# Patient Record
Sex: MALE | Race: WHITE | NOT HISPANIC OR LATINO | URBAN - NONMETROPOLITAN AREA
[De-identification: names, ages, dates, MRNs, and addresses within clinical notes are randomized per-mention and may not be internally consistent; named-entity substitution may affect disease eponyms.]

---

## 2017-08-16 ENCOUNTER — NEW SKIN PROBLEM (OUTPATIENT)
Dept: URBAN - NONMETROPOLITAN AREA CLINIC 3 | Facility: CLINIC | Age: 62
Setting detail: DERMATOLOGY
End: 2017-08-16

## 2017-08-16 DIAGNOSIS — B07.8 OTHER VIRAL WARTS: ICD-10-CM

## 2017-08-16 PROCEDURE — 99202 OFFICE O/P NEW SF 15 MIN: CPT

## 2017-08-16 PROCEDURE — 11100 BX SKIN SUBCUTANEOUS&/MUCOUS MEMBRANE 1 LESION: CPT

## 2018-02-02 ENCOUNTER — OTHER- (OUTPATIENT)
Dept: URBAN - NONMETROPOLITAN AREA CLINIC 3 | Facility: CLINIC | Age: 63
Setting detail: DERMATOLOGY
End: 2018-02-02

## 2018-02-02 DIAGNOSIS — L57.0 ACTINIC KERATOSIS: ICD-10-CM

## 2018-02-02 PROCEDURE — 99212 OFFICE O/P EST SF 10 MIN: CPT

## 2018-02-02 PROCEDURE — 11900 INJECT SKIN LESIONS </W 7: CPT

## 2018-10-18 ENCOUNTER — HOSPITAL ENCOUNTER (EMERGENCY)
Dept: HOSPITAL 62 - ER | Age: 63
Discharge: HOME | End: 2018-10-18
Payer: MEDICARE

## 2018-10-18 VITALS — SYSTOLIC BLOOD PRESSURE: 132 MMHG | DIASTOLIC BLOOD PRESSURE: 72 MMHG

## 2018-10-18 DIAGNOSIS — D72.828: ICD-10-CM

## 2018-10-18 DIAGNOSIS — R42: ICD-10-CM

## 2018-10-18 DIAGNOSIS — A09: Primary | ICD-10-CM

## 2018-10-18 DIAGNOSIS — I10: ICD-10-CM

## 2018-10-18 DIAGNOSIS — R10.9: ICD-10-CM

## 2018-10-18 DIAGNOSIS — F17.200: ICD-10-CM

## 2018-10-18 DIAGNOSIS — R11.2: ICD-10-CM

## 2018-10-18 LAB
ABSOLUTE LYMPHOCYTES# (MANUAL): 0.3 10^3/UL (ref 0.5–4.7)
ABSOLUTE LYMPHOCYTES# (MANUAL): 1.3 10^3/UL (ref 0.5–4.7)
ABSOLUTE MONOCYTES # (MANUAL): 0.7 10^3/UL (ref 0.1–1.4)
ABSOLUTE MONOCYTES # (MANUAL): 1 10^3/UL (ref 0.1–1.4)
ABSOLUTE NEUTROPHILS# (MANUAL): 13.8 10^3/UL (ref 1.7–8.2)
ABSOLUTE NEUTROPHILS# (MANUAL): 9.4 10^3/UL (ref 1.7–8.2)
ADD MANUAL DIFF: YES
ADD MANUAL DIFF: YES
ALBUMIN SERPL-MCNC: 4.7 G/DL (ref 3.5–5)
ALP SERPL-CCNC: 103 U/L (ref 38–126)
ALT SERPL-CCNC: 42 U/L (ref 21–72)
ANION GAP SERPL CALC-SCNC: 16 MMOL/L (ref 5–19)
APPEARANCE UR: (no result)
APTT PPP: YELLOW S
AST SERPL-CCNC: 32 U/L (ref 17–59)
BASE EXCESS BLDV CALC-SCNC: -0.9 MMOL/L
BASOPHILS NFR BLD MANUAL: 0 % (ref 0–2)
BASOPHILS NFR BLD MANUAL: 0 % (ref 0–2)
BILIRUB DIRECT SERPL-MCNC: 0.5 MG/DL (ref 0–0.4)
BILIRUB SERPL-MCNC: 2 MG/DL (ref 0.2–1.3)
BILIRUB UR QL STRIP: NEGATIVE
BUN SERPL-MCNC: 20 MG/DL (ref 7–20)
CALCIUM: 10.1 MG/DL (ref 8.4–10.2)
CHLORIDE SERPL-SCNC: 102 MMOL/L (ref 98–107)
CO2 SERPL-SCNC: 21 MMOL/L (ref 22–30)
EOSINOPHIL NFR BLD MANUAL: 0 % (ref 0–6)
EOSINOPHIL NFR BLD MANUAL: 0 % (ref 0–6)
ERYTHROCYTE [DISTWIDTH] IN BLOOD BY AUTOMATED COUNT: 13.6 % (ref 11.5–14)
ERYTHROCYTE [DISTWIDTH] IN BLOOD BY AUTOMATED COUNT: 13.7 % (ref 11.5–14)
GLUCOSE SERPL-MCNC: 200 MG/DL (ref 75–110)
GLUCOSE UR STRIP-MCNC: NEGATIVE MG/DL
HCO3 BLDV-SCNC: 22.2 MMOL/L (ref 20–32)
HCT VFR BLD CALC: 40.9 % (ref 37.9–51)
HCT VFR BLD CALC: 47.5 % (ref 37.9–51)
HGB BLD-MCNC: 14.1 G/DL (ref 13.5–17)
HGB BLD-MCNC: 16.5 G/DL (ref 13.5–17)
KETONES UR STRIP-MCNC: 80 MG/DL
LIPASE SERPL-CCNC: 218.6 U/L (ref 23–300)
MCH RBC QN AUTO: 29.8 PG (ref 27–33.4)
MCH RBC QN AUTO: 30.4 PG (ref 27–33.4)
MCHC RBC AUTO-ENTMCNC: 34.4 G/DL (ref 32–36)
MCHC RBC AUTO-ENTMCNC: 34.8 G/DL (ref 32–36)
MCV RBC AUTO: 87 FL (ref 80–97)
MCV RBC AUTO: 88 FL (ref 80–97)
MONOCYTES % (MANUAL): 6 % (ref 3–13)
MONOCYTES % (MANUAL): 7 % (ref 3–13)
NEUTS BAND NFR BLD MANUAL: 14 % (ref 3–5)
NITRITE UR QL STRIP: NEGATIVE
PCO2 BLDV: 33 MMHG (ref 35–63)
PH BLDV: 7.45 [PH] (ref 7.3–7.42)
PH UR STRIP: 5 [PH] (ref 5–9)
PLATELET # BLD: 207 10^3/UL (ref 150–450)
PLATELET # BLD: 268 10^3/UL (ref 150–450)
PLATELET COMMENT: ADEQUATE
PLATELET COMMENT: ADEQUATE
POTASSIUM SERPL-SCNC: 3.9 MMOL/L (ref 3.6–5)
PROT SERPL-MCNC: 7.2 G/DL (ref 6.3–8.2)
PROT UR STRIP-MCNC: 30 MG/DL
RBC # BLD AUTO: 4.72 10^6/UL (ref 4.35–5.55)
RBC # BLD AUTO: 5.43 10^6/UL (ref 4.35–5.55)
RBC MORPH BLD: (no result)
SEGMENTED NEUTROPHILS % (MAN): 72 % (ref 42–78)
SEGMENTED NEUTROPHILS % (MAN): 90 % (ref 42–78)
SODIUM SERPL-SCNC: 138.6 MMOL/L (ref 137–145)
SP GR UR STRIP: 1.02
TOTAL CELLS COUNTED BLD: 100
TOTAL CELLS COUNTED BLD: 100
TOXIC GRANULES BLD QL SMEAR: SLIGHT
UROBILINOGEN UR-MCNC: 2 MG/DL (ref ?–2)
VARIANT LYMPHS NFR BLD MANUAL: 3 % (ref 13–45)
VARIANT LYMPHS NFR BLD MANUAL: 7 % (ref 13–45)
WBC # BLD AUTO: 10.4 10^3/UL (ref 4–10.5)
WBC # BLD AUTO: 16.1 10^3/UL (ref 4–10.5)

## 2018-10-18 PROCEDURE — 74177 CT ABD & PELVIS W/CONTRAST: CPT

## 2018-10-18 PROCEDURE — 93005 ELECTROCARDIOGRAM TRACING: CPT

## 2018-10-18 PROCEDURE — 83690 ASSAY OF LIPASE: CPT

## 2018-10-18 PROCEDURE — 80053 COMPREHEN METABOLIC PANEL: CPT

## 2018-10-18 PROCEDURE — 82803 BLOOD GASES ANY COMBINATION: CPT

## 2018-10-18 PROCEDURE — 99285 EMERGENCY DEPT VISIT HI MDM: CPT

## 2018-10-18 PROCEDURE — 85025 COMPLETE CBC W/AUTO DIFF WBC: CPT

## 2018-10-18 PROCEDURE — 96361 HYDRATE IV INFUSION ADD-ON: CPT

## 2018-10-18 PROCEDURE — 93010 ELECTROCARDIOGRAM REPORT: CPT

## 2018-10-18 PROCEDURE — 36415 COLL VENOUS BLD VENIPUNCTURE: CPT

## 2018-10-18 PROCEDURE — 81001 URINALYSIS AUTO W/SCOPE: CPT

## 2018-10-18 PROCEDURE — 83605 ASSAY OF LACTIC ACID: CPT

## 2018-10-18 PROCEDURE — 87040 BLOOD CULTURE FOR BACTERIA: CPT

## 2018-10-18 PROCEDURE — 96374 THER/PROPH/DIAG INJ IV PUSH: CPT

## 2018-10-18 PROCEDURE — 87493 C DIFF AMPLIFIED PROBE: CPT

## 2018-10-18 PROCEDURE — 84484 ASSAY OF TROPONIN QUANT: CPT

## 2018-10-18 RX ADMIN — SODIUM CHLORIDE, SODIUM LACTATE, POTASSIUM CHLORIDE, AND CALCIUM CHLORIDE PRN MLS/HR: .6; .31; .03; .02 INJECTION, SOLUTION INTRAVENOUS at 11:04

## 2018-10-18 RX ADMIN — SODIUM CHLORIDE, SODIUM LACTATE, POTASSIUM CHLORIDE, AND CALCIUM CHLORIDE PRN MLS/HR: .6; .31; .03; .02 INJECTION, SOLUTION INTRAVENOUS at 12:05

## 2018-10-18 NOTE — EKG REPORT
SEVERITY:- ABNORMAL ECG -

SINUS RHYTHM

LEFT ANTERIOR FASCICULAR BLOCK

PROBABLE LVH WITH SECONDARY REPOL ABNRM

QTC PROLONGATION

:

Confirmed by: Khalif Mirza MD 18-Oct-2018 12:38:18

## 2018-10-18 NOTE — EKG REPORT
SEVERITY:- BORDERLINE ECG -

SINUS RHYTHM

NONSPECIFIC ST-T CHANGES- LATERAL LEADS

:

Confirmed by: Khalif Mirza MD 18-Oct-2018 18:23:54

## 2018-10-18 NOTE — ER DOCUMENT REPORT
ED General





- General


Stated Complaint: DIZZINESS


Time Seen by Provider: 10/18/18 10:35


Information source: Patient





- HPI


Patient complains to provider of: lightheadedness


Onset: Other - This 63-year-old man with a history of hypertension and 

hyperlipidemia presents for evaluation of lightheadedness as well as abdominal 

cramping nausea vomiting and diarrhea for the last 3 days.  He notes that he 

has vomited approximately 12 times.  He now is progressed to having diarrhea 

intermittent cramping abdominal pain and what are subjective fevers.





- Related Data


Allergies/Adverse Reactions: 


 





No Known Allergies Allergy (Unverified 10/18/18 13:12)


 











Past Medical History





- General


Information source: Patient





- Social History


Smoking Status: Current Every Day Smoker


Family History: None





Review of Systems





- Review of Systems


-: Yes All other systems reviewed and negative





Physical Exam





- Vital signs


Vitals: 


 











Resp Pulse Ox


 


 25 H  98 


 


 10/18/18 10:48  10/18/18 10:48














- General


General appearance: Anxious


In distress: Mild





- HEENT


Head: Normocephalic


Eyes: Normal


Conjunctiva: Normal


Cornea: Normal


Extraocular movements intact: Yes


Eyelashes: Normal


Pupils: PERRL





- Respiratory


Respiratory status: No respiratory distress


Chest status: Nontender


Breath sounds: Normal


Chest palpation: Normal





- Cardiovascular


Rhythm: Tachycardia


Heart sounds: Normal auscultation


Murmur: No





- Abdominal


Inspection: Normal


Distension: Distended


Tenderness: Tender





- Back


Back: Normal





- Extremities


General upper extremity: Normal inspection, Nontender, Normal ROM, Normal 

strength


General lower extremity: Normal inspection, Nontender, Normal ROM, Normal 

strength





- Neurological


Neuro grossly intact: Yes


Cognition: Normal


Orientation: AAOx4


[place] Coma Scale Eye Opening: Spontaneous


[place] Coma Scale Verbal: Oriented


[place] Coma Scale Motor: Obeys Commands


[place] Coma Scale Total: 15


Speech: Normal


Cranial nerves: Normal


Motor strength normal: LUE, RUE, LLE, RLE





- Psychological


Associated symptoms: Normal affect





Course





- Re-evaluation


Re-evalutation: 





10/18/18 11:50


63-year-old man presents for evaluation of intermittent abdominal pain 

associated with vomiting and diarrhea for the last 3 days.


Examination he is in moderate distress, did defecate upon himself which is 

markedly abnormal from his baseline level of functioning.


Currently he denies any pain nausea or other symptoms disease he feels just 

generally weak.


Will initiate broad workup for his fevers as well as abdominal pain.


Will initiate IV fluid resuscitation.


Will plan for CBC CMP lactate blood cultures CT of the abdomen and pelvis.





10/18/18 15:30


Patient's lactic acid improved after administration of fluids, his CBC did 

demonstrate a leukocytosis however he was repeated and did show improvement.


His abdominal examination remained benign throughout his entire time in the 

emergency department.


His C. difficile study was negative.


He is able to tolerate p.o. in the emergency department including his oral 

antibiotics metronidazole as well as ciprofloxacin.


He stated he was comfortable with current plan to go home.


Did give patient prescriptions, called his prescriptions and to re-aloe as well 

at [phone].


At the time of discharge she remained well-appearing in the care of his wife.





- Vital Signs


Vital signs: 


 











Temp Pulse Resp BP Pulse Ox


 


 98.3 F      17   131/64 H  100 


 


 10/18/18 10:56     10/18/18 13:07  10/18/18 13:07  10/18/18 11:00














- Laboratory


Result Diagrams: 


 10/18/18 10:50





 10/18/18 10:50


Laboratory results interpreted by me: 


 











  10/18/18 10/18/18 10/18/18





  10:50 10:50 10:50


 


WBC  16.1 H  


 


Band Neutrophils %  14 H  


 


Lymphocytes % (Manual)  7 L  


 


Abs Neuts (Manual)  13.8 H  


 


VBG pH    7.45 H


 


VBG pCO2    33.0 L


 


Carbon Dioxide   21 L 


 


Glucose   200 H 


 


Lactic Acid   


 


Total Bilirubin   2.0 H 


 


Direct Bilirubin   0.5 H 


 


Urine Protein   


 


Urine Ketones   


 


Urine Urobilinogen   














  10/18/18 10/18/18





  10:50 12:30


 


WBC  


 


Band Neutrophils %  


 


Lymphocytes % (Manual)  


 


Abs Neuts (Manual)  


 


VBG pH  


 


VBG pCO2  


 


Carbon Dioxide  


 


Glucose  


 


Lactic Acid  3.0 H 


 


Total Bilirubin  


 


Direct Bilirubin  


 


Urine Protein   30 H


 


Urine Ketones   80 H


 


Urine Urobilinogen   2.0 H














Discharge





- Discharge


Clinical Impression: 


Diarrhea


Qualifiers:


 Diarrhea type: infectious Qualified Code(s): A09 - Infectious gastroenteritis 

and colitis, unspecified





Abdominal pain


Qualifiers:


 Abdominal location: unspecified location Qualified Code(s): R10.9 - 

Unspecified abdominal pain





Leukocytosis


Qualifiers:


 Leukocytosis type: other Qualified Code(s): D72.828 - Other elevated white 

blood cell count





Disposition: HOME, SELF-CARE


Instructions:  Diarrhea, Nonspecific ([facility]), Reglan ([facility]), Abdominal Pain ([facility])


Additional Instructions: 


Your seen today in the emergency department for your abdominal pain, vomiting 

and diarrhea.  He had evaluation including a CTA of your abdomen and pelvis as 

well as blood work.


You likely have an infectious diarrhea.


Use the antibiotics prescribed to you as directed.


Follow-up with your doctor this week to ensure that you are doing better.


Return in case you have worsening fever, chills, inability to eat or drink, 

pain which worsens.





Prescriptions: 


Ciprofloxacin HCl [Cipro 500 mg Tablet] 500 mg PO BID #20 tablet


Metoclopramide HCl [Reglan 10 mg Tablet] 10 mg PO TID PRN #15 tablet


 PRN Reason: For Nausea/Vomiting


Metronidazole [Flagyl 500 mg Tablet] 500 mg PO Q6H #28 tablet


Forms:  Elevated Blood Pressure
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
## 2018-10-19 LAB — PATH REV BLD -IMP: (no result)

## 2020-07-09 RX ORDER — TRETINOIN 0.5 MG/G
1 APPLICATION CREAM TOPICAL QHS
Qty: 45 | Refills: 3
Start: 2020-07-09

## 2024-07-22 ENCOUNTER — APPOINTMENT (OUTPATIENT)
Dept: URBAN - NONMETROPOLITAN AREA CLINIC 49 | Age: 69
Setting detail: DERMATOLOGY
End: 2024-07-28

## 2024-07-22 VITALS — DIASTOLIC BLOOD PRESSURE: 79 MMHG | SYSTOLIC BLOOD PRESSURE: 121 MMHG | HEART RATE: 77 BPM

## 2024-07-22 VITALS — HEART RATE: 74 BPM | SYSTOLIC BLOOD PRESSURE: 146 MMHG | DIASTOLIC BLOOD PRESSURE: 81 MMHG

## 2024-07-22 DIAGNOSIS — L57.0 ACTINIC KERATOSIS: ICD-10-CM

## 2024-07-22 PROBLEM — D48.5 NEOPLASM OF UNCERTAIN BEHAVIOR OF SKIN: Status: ACTIVE | Noted: 2024-07-22

## 2024-07-22 PROBLEM — C44.722 SQUAMOUS CELL CARCINOMA OF SKIN OF RIGHT LOWER LIMB, INCLUDING HIP: Status: ACTIVE | Noted: 2024-07-22

## 2024-07-22 PROCEDURE — 17000 DESTRUCT PREMALG LESION: CPT | Mod: 59

## 2024-07-22 PROCEDURE — OTHER MOHS SURGERY: OTHER

## 2024-07-22 PROCEDURE — OTHER BIOPSY BY SHAVE METHOD WITH FROZEN SECTION: OTHER

## 2024-07-22 PROCEDURE — OTHER MIPS QUALITY: OTHER

## 2024-07-22 PROCEDURE — 17313 MOHS 1 STAGE T/A/L: CPT

## 2024-07-22 PROCEDURE — 88331 PATH CONSLTJ SURG 1 BLK 1SPC: CPT | Mod: 59

## 2024-07-22 PROCEDURE — 11102 TANGNTL BX SKIN SINGLE LES: CPT | Mod: 59

## 2024-07-22 PROCEDURE — OTHER LIQUID NITROGEN: OTHER

## 2024-07-22 ASSESSMENT — LOCATION ZONE DERM: LOCATION ZONE: SCALP

## 2024-07-22 ASSESSMENT — LOCATION SIMPLE DESCRIPTION DERM: LOCATION SIMPLE: LEFT SCALP

## 2024-07-22 ASSESSMENT — LOCATION DETAILED DESCRIPTION DERM: LOCATION DETAILED: LEFT CENTRAL FRONTAL SCALP

## 2024-07-23 ENCOUNTER — APPOINTMENT (OUTPATIENT)
Dept: URBAN - NONMETROPOLITAN AREA CLINIC 49 | Age: 69
Setting detail: DERMATOLOGY
End: 2024-07-28

## 2024-07-23 DIAGNOSIS — Z48.817 ENCOUNTER FOR SURGICAL AFTERCARE FOLLOWING SURGERY ON THE SKIN AND SUBCUTANEOUS TISSUE: ICD-10-CM

## 2024-07-23 PROCEDURE — OTHER POST-OP WOUND EVALUATION: OTHER

## 2024-07-23 PROCEDURE — OTHER MIPS QUALITY: OTHER

## 2024-07-23 ASSESSMENT — LOCATION ZONE DERM: LOCATION ZONE: LEG

## 2024-07-23 ASSESSMENT — LOCATION DETAILED DESCRIPTION DERM: LOCATION DETAILED: RIGHT LATERAL KNEE

## 2024-07-23 ASSESSMENT — LOCATION SIMPLE DESCRIPTION DERM: LOCATION SIMPLE: RIGHT KNEE

## 2024-08-13 ENCOUNTER — APPOINTMENT (OUTPATIENT)
Dept: URBAN - NONMETROPOLITAN AREA CLINIC 49 | Age: 69
Setting detail: DERMATOLOGY
End: 2024-08-24

## 2024-08-13 VITALS — DIASTOLIC BLOOD PRESSURE: 74 MMHG | SYSTOLIC BLOOD PRESSURE: 130 MMHG | HEART RATE: 64 BPM

## 2024-08-13 VITALS — SYSTOLIC BLOOD PRESSURE: 128 MMHG | DIASTOLIC BLOOD PRESSURE: 76 MMHG | HEART RATE: 73 BPM

## 2024-08-13 PROBLEM — C44.329 SQUAMOUS CELL CARCINOMA OF SKIN OF OTHER PARTS OF FACE: Status: ACTIVE | Noted: 2024-08-13

## 2024-08-13 PROCEDURE — OTHER MIPS QUALITY: OTHER

## 2024-08-13 PROCEDURE — OTHER MOHS SURGERY: OTHER

## 2024-08-13 PROCEDURE — 17311 MOHS 1 STAGE H/N/HF/G: CPT

## 2024-08-13 PROCEDURE — 13132 CMPLX RPR F/C/C/M/N/AX/G/H/F: CPT

## 2025-04-16 ENCOUNTER — APPOINTMENT (OUTPATIENT)
Dept: URBAN - NONMETROPOLITAN AREA CLINIC 49 | Age: 70
Setting detail: DERMATOLOGY
End: 2025-04-16

## 2025-04-16 DIAGNOSIS — D18.0 HEMANGIOMA: ICD-10-CM

## 2025-04-16 DIAGNOSIS — D22 MELANOCYTIC NEVI: ICD-10-CM

## 2025-04-16 DIAGNOSIS — L82.1 OTHER SEBORRHEIC KERATOSIS: ICD-10-CM

## 2025-04-16 DIAGNOSIS — Z85.828 PERSONAL HISTORY OF OTHER MALIGNANT NEOPLASM OF SKIN: ICD-10-CM

## 2025-04-16 PROBLEM — D18.01 HEMANGIOMA OF SKIN AND SUBCUTANEOUS TISSUE: Status: ACTIVE | Noted: 2025-04-16

## 2025-04-16 PROBLEM — D22.5 MELANOCYTIC NEVI OF TRUNK: Status: ACTIVE | Noted: 2025-04-16

## 2025-04-16 PROCEDURE — OTHER COUNSELING: OTHER

## 2025-04-16 PROCEDURE — 99213 OFFICE O/P EST LOW 20 MIN: CPT

## 2025-04-16 ASSESSMENT — LOCATION SIMPLE DESCRIPTION DERM
LOCATION SIMPLE: UPPER BACK
LOCATION SIMPLE: RIGHT UPPER BACK
LOCATION SIMPLE: RIGHT KNEE
LOCATION SIMPLE: RIGHT POSTERIOR THIGH
LOCATION SIMPLE: LEFT FOREHEAD

## 2025-04-16 ASSESSMENT — LOCATION ZONE DERM
LOCATION ZONE: TRUNK
LOCATION ZONE: FACE
LOCATION ZONE: LEG

## 2025-04-16 ASSESSMENT — LOCATION DETAILED DESCRIPTION DERM
LOCATION DETAILED: INFERIOR THORACIC SPINE
LOCATION DETAILED: RIGHT INFERIOR UPPER BACK
LOCATION DETAILED: LEFT SUPERIOR FOREHEAD
LOCATION DETAILED: RIGHT DISTAL LATERAL POSTERIOR THIGH
LOCATION DETAILED: RIGHT LATERAL KNEE